# Patient Record
Sex: MALE | URBAN - METROPOLITAN AREA
[De-identification: names, ages, dates, MRNs, and addresses within clinical notes are randomized per-mention and may not be internally consistent; named-entity substitution may affect disease eponyms.]

---

## 2018-05-22 ENCOUNTER — OFFICE VISIT (OUTPATIENT)
Dept: PLASTIC SURGERY | Facility: CLINIC | Age: 28
End: 2018-05-22
Payer: COMMERCIAL

## 2018-05-22 VITALS
RESPIRATION RATE: 16 BRPM | SYSTOLIC BLOOD PRESSURE: 132 MMHG | WEIGHT: 160.3 LBS | HEART RATE: 71 BPM | DIASTOLIC BLOOD PRESSURE: 84 MMHG | HEIGHT: 62 IN | OXYGEN SATURATION: 100 % | BODY MASS INDEX: 29.5 KG/M2 | TEMPERATURE: 98.3 F

## 2018-05-22 DIAGNOSIS — F64.0 GENDER DYSPHORIA IN ADOLESCENT AND ADULT: Primary | ICD-10-CM

## 2018-05-22 RX ORDER — LEVOTHYROXINE SODIUM 175 UG/1
175 TABLET ORAL
COMMUNITY
Start: 2017-03-31

## 2018-05-22 RX ORDER — TESTOSTERONE CYPIONATE 200 MG/ML
80 INJECTION, SOLUTION INTRAMUSCULAR
COMMUNITY
Start: 2018-02-20

## 2018-05-22 RX ORDER — FLUTICASONE PROPIONATE 50 MCG
1 SPRAY, SUSPENSION (ML) NASAL
COMMUNITY
Start: 2018-05-11

## 2018-05-22 RX ORDER — ESCITALOPRAM OXALATE 10 MG/1
10 TABLET ORAL
COMMUNITY
Start: 2017-10-16

## 2018-05-22 RX ORDER — MULTIPLE VITAMINS W/ MINERALS TAB 9MG-400MCG
1 TAB ORAL DAILY
COMMUNITY

## 2018-05-22 RX ORDER — TRAZODONE HYDROCHLORIDE 50 MG/1
25-100 TABLET, FILM COATED ORAL
COMMUNITY
Start: 2017-10-16

## 2018-05-22 RX ORDER — ALBUTEROL SULFATE 90 UG/1
1-2 AEROSOL, METERED RESPIRATORY (INHALATION)
COMMUNITY
Start: 2018-05-11

## 2018-05-22 RX ORDER — MONTELUKAST SODIUM 10 MG/1
10 TABLET ORAL
COMMUNITY
Start: 2017-10-17

## 2018-05-22 ASSESSMENT — PAIN SCALES - GENERAL: PAINLEVEL: NO PAIN (0)

## 2018-05-22 NOTE — LETTER
"5/22/2018       RE: Samantha Mixon  [address]  [place] [address]     Dear Colleague,    Thank you for referring your patient, Samantha Mixon, to the [facility] PLASTIC AND RECONSTRUCTIVE SURGERY at [facility]. Please see a copy of my visit note below.    PLASTICS NEW TOP   HPI: This is a 27 year old biological female transitioning to male with a history of gender dysphoria and Mc's syndrome who presents today  for a consultation for a bilateral subcutaneous double mastectomy with nipple grafts. He was referred by Dr. Magallanes, and it has been around 4 months since he made his appointment. Dr. Magallanes prescribes Samantha's testosterone, which he has been on 1.5 years. His therapist is Kenneth, who works out of a small practice in [place], who he has seen for 1.5 years. He has been living \"openly gender fluid/transmasculine\" for over 5 years. He has recently had his name legally changed to Samantha and prefers pronouns they/him. His gender fluctuates between male and nonbinary. He binds with [employer], though he began with [employer]. He has also experimented with other binder brands. Family and friends are supportive. He currently lives alone with cat. His mother wants to take care of him after the surgery. His girlfriend is Jacqueline, who does security at [employer] and wasn't able to make this appointment today.     Medical Hx: No history of lumps, bumps, nipple drainage or other breast problems. Takes Levothyroxine for hypothyroidism. 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 Streaked ovaries. Occasional omeprazole for heartburn. Mild asthma, occasional bronchitis. No diabetes. No healing, scarring or clotting problems. 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 Recent abnormal pap smear.     Surgical Hx: Laparoscopic cholecystectomy. Tonsilectomy/adenoidectomy. Third " "molar extractions.     Family Hx: Family history is unknown due to adoption at birth.    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 He currently has Medicaid but his new job may change his insurance status. Nonsmoker, occasional alcohol use. Exercises with roller derby. Diet: Vegan and gluten-free, rice and beans and tries to be healthy. Has one coffee daily, with some caffeinated tea. Some insomnia, for which he takes trazodone as needed.     Vitals: /84 (BP Location: Left arm, Patient Position: Sitting, Cuff Size: Adult Regular)  Pulse 71  Temp 98.3  F (36.8  C) (Oral)  Resp 16  Ht 1.575 m (5' 2\")  Wt 72.7 kg (160 lb 4.8 oz)  SpO2 100%  BMI 29.32 kg/m2  PE: General: Height: 5' 2\" Weight: 160 lbs 4.8 oz   Chest: Chest tattoo, grade 3 ptosis. Striae bilaterally. Good contour. Breasts apart at midline. Right IMF is ~1cm lower. Volume and symmetry are equal. Some lateral thoracic rolls. No masses. No lymphadenopathy.     Photos taken with consent. Photos in EPIC.     A&P: 27 year old biological female transitioning to male with a history of gender dysphoria who presents for evaluation for a bilateral subcutaneous mastectomy with nipple grafts. After exam and history he is a candidate. We discussed that he will need a letter of support from his therapist, will need a H&P and a mammogram prior to surgery. We also reviewed that more details will be covered at the pre-op visit.     Total time = 30 minutes, over half of which spent discussing surgical options     This note was prepared on behalf of Dr. Maradiaga by Alyse Ng, a trained medical scribe, based on the provider's statements to me.           Again, thank you for allowing me to participate in the care of your patient.      Sincerely,    Gale Maradiaga MD      "

## 2018-05-22 NOTE — MR AVS SNAPSHOT
"              After Visit Summary   5/22/2018    Samantha Mixon    MRN: [id number]           Patient Information     Date Of Birth          1990        Visit Information        Provider Department      5/22/2018 11:00 AM Gale Maradiaga MD [facility] Plastic and Reconstructive Surgery        Today's Diagnoses     Gender dysphoria in adolescent and adult    -  1       Follow-ups after your visit        Who to contact     Please call your clinic at [phone] to:    Ask questions about your health    Make or cancel appointments    Discuss your medicines    Learn about your test results    Speak to your doctor            Additional Information About Your Visit        MyChart Information     [employer] gives you secure access to your electronic health record. If you see a primary care provider, you can also send messages to your care team and make appointments. If you have questions, please call your primary care clinic.  If you do not have a primary care provider, please call [phone] and they will assist you.      [employer] is an electronic gateway that provides easy, online access to your medical records. With [employer], you can request a clinic appointment, read your test results, renew a prescription or communicate with your care team.     To access your existing account, please contact your [facility] or call [phone] for assistance.        Care EveryWhere ID     This is your Care EveryWhere ID. This could be used by other organizations to access your [place] medical records  [id number]        Your Vitals Were     Pulse Temperature Respirations Height Pulse Oximetry BMI (Body Mass Index)    71 98.3  F (36.8  C) (Oral) 16 5' 2\" 100% 29.32 kg/m2       Blood Pressure from Last 3 Encounters:   05/22/18 132/84    Weight from Last 3 Encounters:   05/22/18 160 lb 4.8 oz              Today, you had the following     No orders found for display       Primary Care Provider " Office Phone # Fax #    Mike River Magallanes [phone]       [facility] [address] [place] [address]        Equal Access to Services     TRAVIS LILLY : Karla reyna loni Jean Baptiste, waaxda luqadaha, qaybta kaalmada adequitayada, janice castanedanba brothersquita hernadez shirley montoya. So [facility] [phone].    ATENCIÓN: Si habla español, tiene a zamora disposición servicios gratuitos de asistencia lingüística. Llame al [phone].    We comply with applicable federal civil rights laws and [place] laws. We do not discriminate on the basis of race, color, national origin, age, disability, sex, sexual orientation, or gender identity.            Thank you!     Thank you for choosing [facility] PLASTIC AND RECONSTRUCTIVE SURGERY  for your care. Our goal is always to provide you with excellent care. Hearing back from our patients is one way we can continue to improve our services. Please take a few minutes to complete the written survey that you may receive in the mail after your visit with us. Thank you!             Your Updated Medication List - Protect others around you: Learn how to safely use, store and throw away your medicines at www.disposemymeds.org.          This list is accurate as of 5/22/18 11:59 PM.  Always use your most recent med list.                   Brand Name Dispense Instructions for use Diagnosis    albuterol 108 (90 Base) MCG/ACT Inhaler    PROAIR HFA/PROVENTIL HFA/VENTOLIN HFA     Inhale 1-2 puffs into the lungs        escitalopram 10 MG tablet    LEXAPRO     Take 10 mg by mouth        fluticasone 50 MCG/ACT spray    FLONASE     1 spray        levothyroxine 175 MCG tablet    SYNTHROID/LEVOTHROID     Take 175 mcg by mouth        montelukast 10 MG tablet    SINGULAIR     Take 10 mg by mouth        multivitamin, therapeutic with minerals Tabs tablet      Take 1 tablet by mouth daily        testosterone cypionate 200 MG/ML injection    DEPOTESTOTERONE     Inject 80 mg  into the muscle        traZODone 50 MG tablet    DESYREL     Take  mg by mouth        vitamin D3 1000 units Caps      Take 1,000 Units by mouth

## 2018-05-22 NOTE — PROGRESS NOTES
"[facility]AST[facility] [place]   HPI: This is a 27 year old biological female transitioning to male with a history of gender dysphoria and Mc's syndrome who presents today  for a consultation for a bilateral subcutaneous double mastectomy with nipple grafts. He was referred by Dr. Magallanes, and it has been around 4 months since he made his appointment. Dr. Magallanes prescribes Samantha's testosterone, which he has been on 1.5 years. His therapist is Kenneth, who works out of a small practice in [place], who he has seen for 1.5 years. He has been living \"openly gender fluid/transmasculine\" for over 5 years. He has recently had his name legally changed to Samantha and prefers pronouns they/him. His gender fluctuates between male and nonbinary. He binds with [employer], though he began with [employer]. He has also experimented with other binder brands. Family and friends are supportive. He currently lives alone with cat. His mother wants to take care of him after the surgery. His girlfriend is Jacqueline, who does security at [employer] and wasn't able to make this appointment today.     Medical Hx: No history of lumps, bumps, nipple drainage or other breast problems. Takes Levothyroxine for hypothyroidism. 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 Streaked ovaries. Occasional omeprazole for heartburn. Mild asthma, occasional bronchitis. No diabetes. No healing, scarring or clotting problems. 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 Recent abnormal pap smear.     Surgical Hx: Laparoscopic cholecystectomy. Tonsilectomy/adenoidectomy. Third molar extractions.     Family Hx: Family history is unknown due to adoption at birth.    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 He currently has Medicaid but his new job may change his " "insurance status. Nonsmoker, occasional alcohol use. Exercises with roller [employer]. Diet: Vegan and gluten-free, rice and beans and tries to be healthy. Has one coffee daily, with some caffeinated tea. Some insomnia, for which he takes trazodone as needed.     Vitals: /84 (BP Location: Left arm, Patient Position: Sitting, Cuff Size: Adult Regular)  Pulse 71  Temp 98.3  F (36.8  C) (Oral)  Resp 16  Ht 1.575 m (5' 2\")  Wt 72.7 kg (160 lb 4.8 oz)  SpO2 100%  BMI 29.32 kg/m2  PE: General: Height: 5' 2\" Weight: 160 lbs 4.8 oz   Chest: Chest tattoo, grade 3 ptosis. Striae bilaterally. Good contour. Breasts apart at midline. Right IMF is ~1cm lower. Volume and symmetry are equal. Some lateral thoracic rolls. No masses. No lymphadenopathy.     Photos taken with consent. Photos in EPIC.     A&P: 27 year old biological female transitioning to male with a history of gender dysphoria who presents for evaluation for a bilateral subcutaneous mastectomy with nipple grafts. After exam and history he is a candidate. We discussed that he will need a letter of support from his therapist, will need a H&P and a mammogram prior to surgery. We also reviewed that more details will be covered at the pre-op visit.     Total time = 30 minutes, over half of which spent discussing surgical options     This note was prepared on behalf of Dr. Maradiaga by Alyse Ng, a trained medical scribe, based on the provider's statements to me.      "

## 2018-05-22 NOTE — NURSING NOTE
"Chief Complaint   Patient presents with     Consult     top surgery consultation        Vitals:    05/22/18 1112   BP: 132/84   BP Location: Left arm   Patient Position: Sitting   Cuff Size: Adult Regular   Pulse: 71   Resp: 16   Temp: 98.3  F (36.8  C)   TempSrc: Oral   SpO2: 100%   Weight: 160 lb 4.8 oz   Height: 5' 2\"       Body mass index is 29.32 kg/(m^2).      Clara Webster LPN                          "

## 2019-01-23 ENCOUNTER — PATIENT OUTREACH (OUTPATIENT)
Dept: PLASTIC SURGERY | Facility: CLINIC | Age: 29
End: 2019-01-23

## 2019-01-23 NOTE — PROGRESS NOTES
[facility]:  Care Coordination Note     SITUATION   Patient is a 28 year old who is receiving support for:  Clinic Care Coordination - Follow-up  .    BACKGROUND     Patient requesting top surgery with Maradiaga, initial consult was on 5/22/18.    ASSESSMENT     Surgery              [facility] Assessment  [facility] ([facility]) Enrollment: Enrolled  Patient has a therapist: Yes  Name of therapist: Kenneth Unger   Letter of support #1: Requested(pt reports letter was sent twice several months ago. Unable to locate in chart. )  Surgery being considered: Yes  Mastectomy: Yes(initial consult wtquinton Maradiaga on 5/22/18.)          PLAN          Nursing Interventions:   Letter of support discussed. Requested pt have therapist resend, provided fax #. Mammogram orders from PCP Dr. Magallanes, discussed how to navigate this with PCP. Discussed surgery scheduling process with obtaining LOS and mammo results, then PA then Schedule surgery.     Follow-up plan:  Pt to have LOS refaxed, pt to work with PCP to get mammogram. Once both received, then PA and schedule.        Dilan Rodriguez

## 2019-02-26 ENCOUNTER — PATIENT OUTREACH (OUTPATIENT)
Dept: PLASTIC SURGERY | Facility: CLINIC | Age: 29
End: 2019-02-26

## 2020-03-11 ENCOUNTER — HEALTH MAINTENANCE LETTER (OUTPATIENT)
Age: 30
End: 2020-03-11

## 2021-01-03 ENCOUNTER — HEALTH MAINTENANCE LETTER (OUTPATIENT)
Age: 31
End: 2021-01-03

## 2021-04-25 ENCOUNTER — HEALTH MAINTENANCE LETTER (OUTPATIENT)
Age: 31
End: 2021-04-25

## 2021-07-15 ENCOUNTER — TELEPHONE (OUTPATIENT)
Dept: PLASTIC SURGERY | Facility: CLINIC | Age: 31
End: 2021-07-15

## 2021-07-15 NOTE — TELEPHONE ENCOUNTER
[facility] Call Center    Phone Message    May a detailed message be left on voicemail: yes     Reason for Call: Other: Pt called in and wanted to speak with the clinic about top surgery. Please reach out. Thank you.     Action Taken: Message routed to:  [facility] care    Travel Screening: Not Applicable

## 2021-07-19 NOTE — TELEPHONE ENCOUNTER
Writer called pt regarding top surgery interest. No answer, mailbox full. Unable to leave voicemail. Writer to send mychart.     Alo Peace

## 2021-10-10 ENCOUNTER — HEALTH MAINTENANCE LETTER (OUTPATIENT)
Age: 31
End: 2021-10-10

## 2022-05-21 ENCOUNTER — HEALTH MAINTENANCE LETTER (OUTPATIENT)
Age: 32
End: 2022-05-21

## 2022-09-17 ENCOUNTER — HEALTH MAINTENANCE LETTER (OUTPATIENT)
Age: 32
End: 2022-09-17

## 2023-06-04 ENCOUNTER — HEALTH MAINTENANCE LETTER (OUTPATIENT)
Age: 33
End: 2023-06-04